# Patient Record
Sex: MALE | Race: BLACK OR AFRICAN AMERICAN | Employment: UNEMPLOYED | URBAN - METROPOLITAN AREA
[De-identification: names, ages, dates, MRNs, and addresses within clinical notes are randomized per-mention and may not be internally consistent; named-entity substitution may affect disease eponyms.]

---

## 2021-11-25 ENCOUNTER — APPOINTMENT (OUTPATIENT)
Dept: GENERAL RADIOLOGY | Age: 51
End: 2021-11-25
Attending: NURSE PRACTITIONER

## 2021-11-25 ENCOUNTER — HOSPITAL ENCOUNTER (EMERGENCY)
Age: 51
Discharge: HOME | End: 2021-11-25
Attending: STUDENT IN AN ORGANIZED HEALTH CARE EDUCATION/TRAINING PROGRAM

## 2021-11-25 VITALS
RESPIRATION RATE: 18 BRPM | OXYGEN SATURATION: 98 % | WEIGHT: 220 LBS | TEMPERATURE: 97.6 F | SYSTOLIC BLOOD PRESSURE: 122 MMHG | HEIGHT: 71 IN | DIASTOLIC BLOOD PRESSURE: 83 MMHG | HEART RATE: 109 BPM | BODY MASS INDEX: 30.8 KG/M2

## 2021-11-25 DIAGNOSIS — S49.91XA INJURY OF RIGHT SHOULDER, INITIAL ENCOUNTER: ICD-10-CM
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
## 2021-11-25 PROCEDURE — 73010 X-RAY EXAM OF SHOULDER BLADE: CPT

## 2021-11-25 PROCEDURE — 73030 X-RAY EXAM OF SHOULDER: CPT

## 2021-11-25 PROCEDURE — 99282 EMERGENCY DEPT VISIT SF MDM: CPT

## 2021-11-25 PROCEDURE — 74011250637 HC RX REV CODE- 250/637: Performed by: NURSE PRACTITIONER

## 2021-11-25 RX ORDER — OXYCODONE AND ACETAMINOPHEN 5; 325 MG/1; MG/1
2 TABLET ORAL
Status: COMPLETED | OUTPATIENT
Start: 2021-11-25 | End: 2021-11-25

## 2021-11-25 RX ORDER — WARFARIN SODIUM 5 MG/1
5 TABLET ORAL DAILY
COMMUNITY

## 2021-11-25 RX ORDER — IBUPROFEN 800 MG/1
800 TABLET ORAL
Qty: 20 TABLET | Refills: 0 | Status: SHIPPED | OUTPATIENT
Start: 2021-11-25 | End: 2021-12-02

## 2021-11-25 RX ORDER — TRAMADOL HYDROCHLORIDE 50 MG/1
50 TABLET ORAL
Qty: 18 TABLET | Refills: 0 | Status: SHIPPED | OUTPATIENT
Start: 2021-11-25 | End: 2021-11-28

## 2021-11-25 RX ADMIN — OXYCODONE AND ACETAMINOPHEN 2 TABLET: 5; 325 TABLET ORAL at 14:05

## 2021-11-25 NOTE — DISCHARGE INSTRUCTIONS
Please place ice on your shoulder and wear immobilizer as directed   Follow up with orthopedics as soon as possible, call tomorrow to set up an appointment   Return to the ER for any worsening or worrisome symptoms   Lidocaine with Icy Hot patches can also be helpful, you can buy these over the counter at any pharmacy/ most grocery stores

## 2021-11-25 NOTE — ED TRIAGE NOTES
Pt states he fell yesterday down the steps after missing a step. Right shoulder injury with pain. Unable to lift arm. Requesting sling and possibly pain meds.  Took motrin and tramadol at home with some relief

## 2021-11-25 NOTE — ED PROVIDER NOTES
HPI   Meeta Pedroza is a 45 y.o. male with Hx of antithrombin 3 deficiency, bilateral femoral head AVN  who presents ambulatory to [facility] ED with cc of R shoulder/ scapula pain. Pt reports that he lost his step and injured his R shoulder/ scapula yesterday. No LOC, head injury, or HA. Pt states increased pain w/ ROM last night. Reports that he is unable to extend his R arm w/o severe pain. Took Motrin last night and also took Tramadol, with some relief of pain. Takes Coumadin for clotting d/o- states has home INR machine and regularly checks \"I am a hard stick\"- INR was 2.2 yesterday. Denies recent F/C, N/V/D, cough, congestion, CP, SOB, difficulty breathing, or urinary concerns. Moved to [place] from [place] in the last 6-7 months. PCP: None    There are no other complaints, changes or physical findings at this time. Past Medical History:   Diagnosis Date    Antithrombin 3 deficiency ([facility] [address]        No past surgical history on file. History reviewed. No pertinent family history. Social History     Socioeconomic History    Marital status: SINGLE     Spouse name: Not on file    Number of children: Not on file    Years of education: Not on file    Highest education level: Not on file   Occupational History    Not on file   Tobacco Use    Smoking status: Never Smoker    Smokeless tobacco: Not on file   Substance and Sexual Activity    Alcohol use: Not Currently    Drug use: Never    Sexual activity: Not on file   Other Topics Concern    Not on file   Social History Narrative    Not on file     Social Determinants of Health     Financial Resource Strain:     Difficulty of Paying Living Expenses: Not on file   Food Insecurity:     Worried About Running Out of Food in the Last Year: Not on file    Harmony of Food in the Last Year: Not on file   Transportation Needs:     Lack of Transportation (Medical): Not on file    Lack of Transportation (Non-Medical):  Not on file   Physical Activity:     Days of Exercise per Week: Not on file    Minutes of Exercise per Session: Not on file   Stress:     Feeling of Stress : Not on file   Social Connections:     Frequency of Communication with Friends and Family: Not on file    Frequency of Social Gatherings with Friends and Family: Not on file    Attends L[employer] day Services: Not on file    Active Member of [employer] or Organizations: Not on file    Attends Club or Organization Meetings: Not on file    Marital Status: Not on file   Intimate Partner Violence:     Fear of Current or Ex-Partner: Not on file    Emotionally Abused: Not on file    Physically Abused: Not on file    Sexually Abused: Not on file   Housing Stability:     Unable to Pay for Housing in the Last Year: Not on file    Number of [place] in the Last Year: Not on file    Unstable Housing in the Last Year: Not on file         ALLERGIES: Patient has no known allergies. Review of Systems   Constitutional: Negative for activity change, appetite change, chills and fever. HENT: Negative for congestion, rhinorrhea and sore throat. Eyes: Negative for visual disturbance. Respiratory: Negative for cough and shortness of breath. Cardiovascular: Negative for chest pain. Gastrointestinal: Negative for abdominal pain, diarrhea, nausea and vomiting. Genitourinary: Negative for dysuria, flank pain, frequency and urgency. Musculoskeletal: Positive for arthralgias, joint swelling and myalgias. Negative for back pain, gait problem and neck pain. Skin: Negative for color change and rash. Neurological: Negative for dizziness, weakness, light-headedness and headaches. Psychiatric/Behavioral: Negative for agitation, behavioral problems and confusion. All other systems reviewed and are negative.       Vitals:    11/25/21 1351 11/25/21 1459   BP: 118/77 122/83   Pulse: (!) 133 (!) 109   Resp: 18    Temp: 97.6 °F (36.4 °C)    SpO2: 100% 98%   Weight: 99.8 kg (220 lb)    Height: 5' 11\" (1.803 m)             Physical Exam  Vitals and nursing note reviewed. Constitutional:       General: He is not in acute distress. Appearance: He is well-developed. HENT:      Head: Normocephalic and atraumatic. Right Ear: External ear normal.      Left Ear: External ear normal.   Eyes:      Conjunctiva/sclera: Conjunctivae normal.      Pupils: Pupils are equal, round, and reactive to light. Cardiovascular:      Rate and Rhythm: Regular rhythm. Tachycardia present. Heart sounds: Normal heart sounds. Pulmonary:      Effort: Pulmonary effort is normal.      Breath sounds: Normal breath sounds. Musculoskeletal:         General: Swelling (R shoulder ), tenderness (R shoulder/ scapula ) and signs of injury (R shoulder, scapula ) present. No deformity. Normal range of motion. Cervical back: Normal range of motion and neck supple. Skin:     General: Skin is warm and dry. Neurological:      Mental Status: He is alert and oriented to person, place, and time. Psychiatric:         Behavior: Behavior normal.         Thought Content: Thought content normal.         Judgment: Judgment normal.          MDM  Number of Diagnoses or Management Options  Fall, initial encounter  Injury of right shoulder, initial encounter  Diagnosis management comments: DDx: fx, sprain, ligament injury     Pt states that he sustained right shoulder injury after falling and taking impact on right shoulder. No acute findings on x-ray. Has not had relief of pain with medications at home. We have talked about the dangers of taking NSAIDs and preferentially should be taking Tylenol for pain relief given the fact that he takes warfarin. Patient stated understanding. He was provided with a shoulder immobilizer while in the emergency department and advised to follow-up with orthopedics as soon as possible. He was given education on immobilizer use at home as well as pain management.   Additionally, patient was noted to have a higher heart rate in triage while in pain. On asking the patient what his baseline heart rate was, he told me that it was in the \"upper 90s and occasionally hit 3 digits\" at baseline. Pt stated he was in the process of getting PCP set up and will follow up to have HR rechecked ASAP. Reasons to return to ED provided. Amount and/or Complexity of Data Reviewed  Tests in the radiology section of CPT®: ordered and reviewed  Review and summarize past medical records: yes           Procedures    LABORATORY TESTS:  No results found for this or any previous visit (from the past 12 hour(s)). IMAGING RESULTS:  XR SCAPULA RT   Final Result   No acute abnormality. XR SHOULDER RT AP/LAT MIN 2 V   Final Result   No acute abnormality. MEDICATIONS GIVEN:  Medications   oxyCODONE-acetaminophen (PERCOCET) 5-325 mg per tablet 2 Tablet (2 Tablets Oral Given 11/25/21 1405)       IMPRESSION:  1. Fall, initial encounter    2. Injury of right shoulder, initial encounter        PLAN:  1. Discharge Medication List as of 11/25/2021  3:07 PM      START taking these medications    Details   ibuprofen (MOTRIN) 800 mg tablet Take 1 Tablet by mouth every six (6) hours as needed for Pain for up to 7 days. , Print, Disp-20 Tablet, R-0      traMADoL (Ultram) 50 mg tablet Take 1 Tablet by mouth every four (4) hours as needed for Pain for up to 3 days. Max Daily Amount: 300 mg., Print, Disp-18 Tablet, R-0         CONTINUE these medications which have NOT CHANGED    Details   warfarin (Coumadin) 5 mg tablet Take 5 mg by mouth daily. , Historical Med           2.    Follow-up Information     Follow up With Specialties Details Why Contact Info    [address] DEP Emergency Medicine Go to  As needed, If symptoms worsen Josette Moody [phone] [facility]    Isabel Storey MD Orthopedic Surgery Schedule an appointment as soon as possible for a visit  Please follow up with orthopedics as soon as possible [address] [facility] [address]  [place] [address]  [phone]          3.  Return to ED if worse

## 2023-05-15 RX ORDER — WARFARIN SODIUM 5 MG/1
5 TABLET ORAL DAILY
COMMUNITY